# Patient Record
Sex: FEMALE | Race: WHITE | HISPANIC OR LATINO | URBAN - METROPOLITAN AREA
[De-identification: names, ages, dates, MRNs, and addresses within clinical notes are randomized per-mention and may not be internally consistent; named-entity substitution may affect disease eponyms.]

---

## 2020-02-21 ENCOUNTER — HOSPITAL ENCOUNTER (EMERGENCY)
Facility: HOSPITAL | Age: 25
Discharge: HOME | End: 2020-02-21
Attending: EMERGENCY MEDICINE

## 2020-02-21 VITALS
WEIGHT: 188 LBS | OXYGEN SATURATION: 100 % | HEART RATE: 69 BPM | SYSTOLIC BLOOD PRESSURE: 99 MMHG | HEIGHT: 61 IN | TEMPERATURE: 98 F | DIASTOLIC BLOOD PRESSURE: 56 MMHG | BODY MASS INDEX: 35.5 KG/M2 | RESPIRATION RATE: 16 BRPM

## 2020-02-21 DIAGNOSIS — R10.13 EPIGASTRIC ABDOMINAL PAIN: ICD-10-CM

## 2020-02-21 DIAGNOSIS — R10.9 ABDOMINAL PAIN: ICD-10-CM

## 2020-02-21 DIAGNOSIS — D64.9 ANEMIA, UNSPECIFIED TYPE: ICD-10-CM

## 2020-02-21 DIAGNOSIS — K29.70 GASTRITIS, PRESENCE OF BLEEDING UNSPECIFIED, UNSPECIFIED CHRONICITY, UNSPECIFIED GASTRITIS TYPE: Primary | ICD-10-CM

## 2020-02-21 PROBLEM — K81.0 ACUTE CHOLECYSTITIS: Status: ACTIVE | Noted: 2019-01-27

## 2020-02-21 PROBLEM — Z37.9 NORMAL LABOR: Status: ACTIVE | Noted: 2018-12-09

## 2020-02-21 PROBLEM — O34.219 PREVIOUS CESAREAN DELIVERY AFFECTING PREGNANCY, ANTEPARTUM: Status: ACTIVE | Noted: 2020-02-21

## 2020-02-21 PROBLEM — Z34.90 PREGNANCY: Status: ACTIVE | Noted: 2018-12-09

## 2020-02-21 LAB
ALBUMIN SERPL BCP-MCNC: 3.8 G/DL (ref 3.5–5.2)
ALP SERPL-CCNC: 112 U/L (ref 55–135)
ALT SERPL W/O P-5'-P-CCNC: 8 U/L (ref 10–44)
ANION GAP SERPL CALC-SCNC: 8 MMOL/L (ref 8–16)
AST SERPL-CCNC: 17 U/L (ref 10–40)
B-HCG UR QL: NEGATIVE
BACTERIA #/AREA URNS AUTO: NORMAL /HPF
BASOPHILS # BLD AUTO: 0.04 K/UL (ref 0–0.2)
BASOPHILS NFR BLD: 0.4 % (ref 0–1.9)
BILIRUB SERPL-MCNC: 0.5 MG/DL (ref 0.1–1)
BILIRUB UR QL STRIP: NEGATIVE
BUN SERPL-MCNC: 11 MG/DL (ref 6–20)
CALCIUM SERPL-MCNC: 8.8 MG/DL (ref 8.7–10.5)
CHLORIDE SERPL-SCNC: 107 MMOL/L (ref 95–110)
CLARITY UR REFRACT.AUTO: ABNORMAL
CO2 SERPL-SCNC: 25 MMOL/L (ref 23–29)
COLOR UR AUTO: YELLOW
CREAT SERPL-MCNC: 0.8 MG/DL (ref 0.5–1.4)
CTP QC/QA: YES
DIFFERENTIAL METHOD: ABNORMAL
EOSINOPHIL # BLD AUTO: 0.2 K/UL (ref 0–0.5)
EOSINOPHIL NFR BLD: 2.4 % (ref 0–8)
ERYTHROCYTE [DISTWIDTH] IN BLOOD BY AUTOMATED COUNT: 17.1 % (ref 11.5–14.5)
EST. GFR  (AFRICAN AMERICAN): >60 ML/MIN/1.73 M^2
EST. GFR  (NON AFRICAN AMERICAN): >60 ML/MIN/1.73 M^2
GLUCOSE SERPL-MCNC: 97 MG/DL (ref 70–110)
GLUCOSE UR QL STRIP: NEGATIVE
HCT VFR BLD AUTO: 28 % (ref 37–48.5)
HGB BLD-MCNC: 8.2 G/DL (ref 12–16)
HGB UR QL STRIP: NEGATIVE
IMM GRANULOCYTES # BLD AUTO: 0.03 K/UL (ref 0–0.04)
IMM GRANULOCYTES NFR BLD AUTO: 0.3 % (ref 0–0.5)
KETONES UR QL STRIP: NEGATIVE
LEUKOCYTE ESTERASE UR QL STRIP: NEGATIVE
LIPASE SERPL-CCNC: 7 U/L (ref 4–60)
LYMPHOCYTES # BLD AUTO: 2.3 K/UL (ref 1–4.8)
LYMPHOCYTES NFR BLD: 25.4 % (ref 18–48)
MCH RBC QN AUTO: 21.6 PG (ref 27–31)
MCHC RBC AUTO-ENTMCNC: 29.3 G/DL (ref 32–36)
MCV RBC AUTO: 74 FL (ref 82–98)
MICROSCOPIC COMMENT: NORMAL
MONOCYTES # BLD AUTO: 0.7 K/UL (ref 0.3–1)
MONOCYTES NFR BLD: 7.2 % (ref 4–15)
NEUTROPHILS # BLD AUTO: 5.8 K/UL (ref 1.8–7.7)
NEUTROPHILS NFR BLD: 64.3 % (ref 38–73)
NITRITE UR QL STRIP: NEGATIVE
NRBC BLD-RTO: 0 /100 WBC
PH UR STRIP: 5 [PH] (ref 5–8)
PLATELET # BLD AUTO: 290 K/UL (ref 150–350)
PMV BLD AUTO: 10.3 FL (ref 9.2–12.9)
POTASSIUM SERPL-SCNC: 3.5 MMOL/L (ref 3.5–5.1)
PROT SERPL-MCNC: 7.6 G/DL (ref 6–8.4)
PROT UR QL STRIP: NEGATIVE
RBC # BLD AUTO: 3.8 M/UL (ref 4–5.4)
RBC #/AREA URNS AUTO: 1 /HPF (ref 0–4)
SODIUM SERPL-SCNC: 140 MMOL/L (ref 136–145)
SP GR UR STRIP: 1.02 (ref 1–1.03)
SQUAMOUS #/AREA URNS AUTO: 8 /HPF
URN SPEC COLLECT METH UR: ABNORMAL
WBC # BLD AUTO: 8.99 K/UL (ref 3.9–12.7)
WBC #/AREA URNS AUTO: 0 /HPF (ref 0–5)

## 2020-02-21 PROCEDURE — 80053 COMPREHEN METABOLIC PANEL: CPT

## 2020-02-21 PROCEDURE — 93010 EKG 12-LEAD: ICD-10-PCS | Mod: ,,, | Performed by: INTERNAL MEDICINE

## 2020-02-21 PROCEDURE — 81001 URINALYSIS AUTO W/SCOPE: CPT

## 2020-02-21 PROCEDURE — 99284 EMERGENCY DEPT VISIT MOD MDM: CPT | Mod: ,,, | Performed by: EMERGENCY MEDICINE

## 2020-02-21 PROCEDURE — 99284 PR EMERGENCY DEPT VISIT,LEVEL IV: ICD-10-PCS | Mod: ,,, | Performed by: EMERGENCY MEDICINE

## 2020-02-21 PROCEDURE — 99284 EMERGENCY DEPT VISIT MOD MDM: CPT | Mod: 25

## 2020-02-21 PROCEDURE — 81025 URINE PREGNANCY TEST: CPT | Performed by: PHYSICIAN ASSISTANT

## 2020-02-21 PROCEDURE — 93005 ELECTROCARDIOGRAM TRACING: CPT

## 2020-02-21 PROCEDURE — 85025 COMPLETE CBC W/AUTO DIFF WBC: CPT

## 2020-02-21 PROCEDURE — 83690 ASSAY OF LIPASE: CPT

## 2020-02-21 PROCEDURE — 63600175 PHARM REV CODE 636 W HCPCS: Performed by: PHYSICIAN ASSISTANT

## 2020-02-21 PROCEDURE — 96361 HYDRATE IV INFUSION ADD-ON: CPT

## 2020-02-21 PROCEDURE — 93010 ELECTROCARDIOGRAM REPORT: CPT | Mod: ,,, | Performed by: INTERNAL MEDICINE

## 2020-02-21 PROCEDURE — 96374 THER/PROPH/DIAG INJ IV PUSH: CPT

## 2020-02-21 PROCEDURE — 25000003 PHARM REV CODE 250: Performed by: PHYSICIAN ASSISTANT

## 2020-02-21 RX ORDER — ONDANSETRON 4 MG/1
4 TABLET, ORALLY DISINTEGRATING ORAL EVERY 6 HOURS PRN
Qty: 15 TABLET | Refills: 0 | Status: SHIPPED | OUTPATIENT
Start: 2020-02-21

## 2020-02-21 RX ORDER — SUCRALFATE 1 G/1
1 TABLET ORAL 4 TIMES DAILY PRN
Qty: 40 TABLET | Refills: 0 | Status: SHIPPED | OUTPATIENT
Start: 2020-02-21

## 2020-02-21 RX ORDER — ONDANSETRON 2 MG/ML
8 INJECTION INTRAMUSCULAR; INTRAVENOUS
Status: COMPLETED | OUTPATIENT
Start: 2020-02-21 | End: 2020-02-21

## 2020-02-21 RX ORDER — PANTOPRAZOLE SODIUM 20 MG/1
20 TABLET, DELAYED RELEASE ORAL DAILY
Qty: 30 TABLET | Refills: 0 | Status: SHIPPED | OUTPATIENT
Start: 2020-02-21 | End: 2021-02-20

## 2020-02-21 RX ORDER — FERROUS SULFATE 325(65) MG
325 TABLET ORAL DAILY
Qty: 30 TABLET | Refills: 0 | Status: SHIPPED | OUTPATIENT
Start: 2020-02-21

## 2020-02-21 RX ADMIN — SODIUM CHLORIDE 1000 ML: 0.9 INJECTION, SOLUTION INTRAVENOUS at 09:02

## 2020-02-21 RX ADMIN — ALUMINUM HYDROXIDE, MAGNESIUM HYDROXIDE, AND SIMETHICONE 50 ML: 200; 200; 20 SUSPENSION ORAL at 09:02

## 2020-02-21 RX ADMIN — ONDANSETRON 8 MG: 2 INJECTION INTRAMUSCULAR; INTRAVENOUS at 09:02

## 2020-02-22 NOTE — DISCHARGE INSTRUCTIONS
I suspect that your abdominal pain is caused by gastritis which is irritation of the lining of the stomach.  I am prescribing medicine for pain and a medicine to help suppress acid in your stomach.  Please do not take medicines such as ibuprofen or Advil as these can irritate the lining of your stomach.  Your labs also show that you are anemic.  I am prescribing an iron supplement.  Iron supplements will turn your stools dark.    Please schedule a follow-up appointment with a primary care doctor.  I have listed a clinic above where you can be seen without insurance.  If you start to vomit blood, have severe abdominal pain or cannot stop vomiting please return to the emergency department.      Sospecho que yo dolor abdominal es causado por gastritis, que es la irritación del revestimiento del estómago. Estoy recetando medicamentos para el dolor y un medicamento para ayudar a suprimir el ácido en el estómago. No tome medicamentos alberto ibuprofeno o Advil, ya que pueden irritar el revestimiento del estómago. [facility] también muestran que tiene anemia. Estoy prescribiendo un suplemento de bernice. Los suplementos de bernice oscurecerán las heces.    Programe jack lenore de seguimiento con un médico de atención primaria. He enumerado jack clínica arriba donde puede ser atendido sin seguro. Si comienza a vomitar eusebio, tiene dolor abdominal intenso o no puede dejar de vomitar, regrese al departamento de emergencias.

## 2020-02-22 NOTE — ED PROVIDER NOTES
"Encounter Date: 2/21/2020       History     Chief Complaint   Patient presents with    Abdominal Pain     n/v x2 days. denies hematemesis     84-year-old female with no known past medical history presents for epigastric abdominal pain for 2 days.  The pain feels like burning and is made worse with eating.  She states think I have gastritis". She took omeprazole without any relief.  She reports associated nausea, nonbloody emesis and several episodes of diarrhea as well as headache. She denies bloody or dark stool, chest pain, shortness of breath, fever or urinary symptoms. She denies heavy NSAID or EtOH use.  Only prior abdominal surgery is a cholecystectomy.  Patient is predominantly Vietnamese-speaking, translation provided by RN.  Patient offered web care [employer] translation services which she declined.        Review of patient's allergies indicates:  No Known Allergies  History reviewed. No pertinent past medical history.  History reviewed. No pertinent surgical history.  History reviewed. No pertinent family history.  Social History     Tobacco Use    Smoking status: Not on file   Substance Use Topics    Alcohol use: Not on file    Drug use: Not on file     Review of Systems   Constitutional: Negative for fever.   HENT: Negative for sore throat.    Respiratory: Negative for shortness of breath.    Cardiovascular: Negative for chest pain and palpitations.   Gastrointestinal: Positive for abdominal pain, diarrhea, nausea and vomiting. Negative for abdominal distention, anal bleeding, blood in stool, constipation and rectal pain.   Genitourinary: Negative for dysuria, flank pain and hematuria.   Musculoskeletal: Negative for back pain.   Skin: Negative for rash.   Neurological: Positive for headaches. Negative for weakness.   Hematological: Does not bruise/bleed easily.       Physical Exam     Initial Vitals [02/21/20 1919]   BP Pulse Resp Temp SpO2   108/64 85 15 97.7 °F (36.5 °C) 99 %      MAP       --         Physical " Exam    Nursing note and vitals reviewed.  Constitutional: She appears well-developed and well-nourished. She is not diaphoretic. No distress.   HENT:   Head: Normocephalic and atraumatic.   Eyes: EOM are normal. Pupils are equal, round, and reactive to light.   Neck: Normal range of motion.   Cardiovascular: Normal rate, regular rhythm, normal heart sounds and intact distal pulses. Exam reveals no gallop and no friction rub.    No murmur heard.  Pulmonary/Chest: Breath sounds normal. No respiratory distress. She has no wheezes. She has no rhonchi. She has no rales. She exhibits no tenderness.   Abdominal: Soft. Bowel sounds are normal. She exhibits no distension and no mass. There is tenderness in the epigastric area. There is no rebound and no guarding.   Musculoskeletal: Normal range of motion.   Neurological: She is alert and oriented to person, place, and time.   Skin: Skin is warm and dry.   Psychiatric: She has a normal mood and affect.         ED Course   Procedures  Labs Reviewed   CBC W/ AUTO DIFFERENTIAL - Abnormal; Notable for the following components:       Result Value    RBC 3.80 (*)     Hemoglobin 8.2 (*)     Hematocrit 28.0 (*)     Mean Corpuscular Volume 74 (*)     Mean Corpuscular Hemoglobin 21.6 (*)     Mean Corpuscular Hemoglobin Conc 29.3 (*)     RDW 17.1 (*)     All other components within normal limits   COMPREHENSIVE METABOLIC PANEL - Abnormal; Notable for the following components:    ALT 8 (*)     All other components within normal limits   URINALYSIS, REFLEX TO URINE CULTURE - Abnormal; Notable for the following components:    Appearance, UA Hazy (*)     All other components within normal limits    Narrative:     Preferred Collection Type->Urine, Clean Catch   LIPASE   URINALYSIS MICROSCOPIC    Narrative:     Preferred Collection Type->Urine, Clean Catch   POCT URINE PREGNANCY          Imaging Results    None          Medical Decision Making:   History:   Old Medical Records: I decided to  obtain old medical records.  Old Records Summarized: records from another hospital.       <> Summary of Records: Patient seen at [facility] in January of 2019 for cholecystitis  Initial Assessment:   24-year-old female presenting for epigastric abdominal pain made worse with eating.  Vitals are normal, she appears uncomfortable but nontoxic.  Differential Diagnosis:   Gastritis  PUD  Pancreatitis  Dehydration  Gastroenteritis  Low suspicion for surgical pathology given prior cholecystectomy  Clinical Tests:   Lab Tests: Ordered and Reviewed  ED Management:  Will check labs, give fluids, Zofran, GI cocktail and reassess.    Patient reports improvement of symptoms after therapy.  Labs are notable for microcytic anemia with hemoglobin of 8.2.  I discussed this result with the patient, she states that she does not know if she had been anemic previously.  Per chart review from [facility] everywhere she did have hemoglobin of 8 in 2018 which increased to 11 last year.  She does report that she has very heavy menstrual cycles.  Will start on iron supplementation.  Discharge with sucralfate, Zofran and PPI and instruct her to follow up with PCP.  Referral information provided for primary care clinics. Stressed the importance of follow-up, strict ED return precautions given.  Patient voiced understanding and is comfortable with discharge. I discussed this patient with my supervising physician.                Attending Attestation:     Physician Attestation Statement for NP/PA:   I have conducted a face to face encounter with this patient in addition to the NP/PA, due to Medical Complexity    Other NP/PA Attestation Additions:      Medical Decision Making: ATTENDING PHYSICIAN ATTESTATION   I have repeated the key portions of the NP/PA's history and physical (face to face), reviewed and agree with the NP/PA's medical documentation, and supervised and managed the medical care of the patient.       The patient was also seen and examined by  me.     Time patient was seen by the provider: 11:51 PM     The patient is a 24 y.o. female with no pertinent medical history who presents to the ED with a complaint of abdominal pain. Patient reports epigastric abdominal pain associated with nausea, vomiting, diarrhea  x 2 days. She states the pain is worse with eating. She has tried omeprazole with little to no relief. She also endorses headache.     Physical Exam:   CONSTITUTIONAL: Well developed, well nourished, in no acute distress, calm   HENT: Normocephalic, atraumatic     EYES: Sclerae anicteric   NECK: Supple   RESPIRATORY: Breathing comfortably. Speaking in full sentences   ABDOMEN: Mild epigastric tenderness with no guarding.   NEUROLOGIC: Alert, interacting normally. No facial droop.   MSK: Moving all four extremities.   Skin: Warm and dry. No visible rash on exposed areas of skin.     Psych: Mood and affect normal.       Epigastric pain with vomiting for two days. Patient abdominal exam is benign. Labs reveal hemoglobin of 8.2, on care everywhere has been low previously; although, a month ago was in the 11s. Patient reports very heavy periods; although, not currently bleeding. Denies dark or bloody stools. Given symptomatic treatment in ER with improvement. Tolerating PO. Patient hemodynamically stable. Not with consistent dangerous GI bleed or dangerous anemia at this time. Will discharge with nausea medicine, antiacids and iron and have patient follow up with very strict return precautions.       Mik Waters MD   Department of Emergency Medicine                  ED Course as of Feb 22 0051 Fri Feb 21, 2020 2125 Microcytic anemia with hemoglobin at 8.2.  Per chart review, the patient was previously anemic 2018 but hemoglobin up to 11 in 2019   CBC auto differential(!) [CC]   2126 LFTs normal   Comprehensive metabolic panel(!) [CC]   2126 Normal lipase   Lipase [CC]   2126 No signs of UTI   Urinalysis, Reflex to Urine Culture Urine, Clean  Catch(!) [CC]      ED Course User Index  [CC] Elidia Pool PA-C                Clinical Impression:       ICD-10-CM ICD-9-CM   1. Gastritis, presence of bleeding unspecified, unspecified chronicity, unspecified gastritis type K29.70 535.50   2. Abdominal pain R10.9 789.00   3. Anemia, unspecified type D64.9 285.9   4. Epigastric abdominal pain R10.13 789.06         Disposition:   Disposition: Discharged  Condition: Stable                     Elidia Pool PA-C  02/22/20 0055       Mik Waters MD  02/23/20 1551

## 2020-02-22 NOTE — PROVIDER PROGRESS NOTES - EMERGENCY DEPT.
Encounter Date: 2/21/2020    ED Physician Progress Notes         EKG - STEMI Decision  Initial Reading: No STEMI present.

## 2020-02-22 NOTE — ED TRIAGE NOTES
Hubertjuana Schuster, an 24 y.o. female presents to the ED with nausea, vomiting, diarrhea and epigastric pain x 2 days unrelieved by omeprazole.  Patient further reports headache intermittently.  States that the pain gets worse after she eats.  Patient has had a cholecystectomy      Review of patient's allergies indicates:  No Known Allergies  Chief Complaint   Patient presents with    Abdominal Pain     n/v x2 days. denies hematemesis     History reviewed. No pertinent past medical history.

## 2020-05-25 PROBLEM — Z37.9 NORMAL LABOR: Status: RESOLVED | Noted: 2018-12-09 | Resolved: 2020-05-25
